# Patient Record
Sex: MALE | Race: WHITE | HISPANIC OR LATINO | URBAN - METROPOLITAN AREA
[De-identification: names, ages, dates, MRNs, and addresses within clinical notes are randomized per-mention and may not be internally consistent; named-entity substitution may affect disease eponyms.]

---

## 2022-06-24 VITALS
HEART RATE: 72 BPM | RESPIRATION RATE: 18 BRPM | DIASTOLIC BLOOD PRESSURE: 74 MMHG | OXYGEN SATURATION: 100 % | TEMPERATURE: 98 F | SYSTOLIC BLOOD PRESSURE: 122 MMHG

## 2022-06-24 PROCEDURE — 99283 EMERGENCY DEPT VISIT LOW MDM: CPT | Mod: 25

## 2022-06-24 PROCEDURE — 65205 REMOVE FOREIGN BODY FROM EYE: CPT | Mod: RT

## 2022-06-25 ENCOUNTER — HOSPITAL ENCOUNTER (EMERGENCY)
Facility: HOSPITAL | Age: 37
Discharge: HOME | End: 2022-06-25
Attending: EMERGENCY MEDICINE

## 2022-06-25 DIAGNOSIS — T15.91XA FOREIGN BODY, EYE, RIGHT, INITIAL ENCOUNTER: Primary | ICD-10-CM

## 2022-06-25 PROCEDURE — 25000003 PHARM REV CODE 250

## 2022-06-25 RX ORDER — TETRACAINE HYDROCHLORIDE 5 MG/ML
2 SOLUTION OPHTHALMIC
Status: DISCONTINUED | OUTPATIENT
Start: 2022-06-25 | End: 2022-06-25 | Stop reason: HOSPADM

## 2022-06-25 RX ORDER — ERYTHROMYCIN 5 MG/G
OINTMENT OPHTHALMIC
Qty: 1 G | Refills: 0 | Status: SHIPPED | OUTPATIENT
Start: 2022-06-25

## 2022-06-25 NOTE — DISCHARGE INSTRUCTIONS
Use ungüento oftálmico antibiótico según lo prescrito, seguimiento con oftalmología en 3-5 días. Se colocó shamir referencia y se comunicarán para programar shamir alfa. Si no llame a oftalmología [facility].    regrese a la adam de urgencias si desarrolla cambios en la visión, fiebre, secreción o drenaje del david

## 2022-06-25 NOTE — ED PROVIDER NOTES
Encounter Date: 6/24/2022       History     Chief Complaint   Patient presents with    Foreign Body in Eye     Patient presents to the ED with reports of having foreign body sensation to the right eye. Right eye with redness, irritation, and tearing. States he was at working when he felt debris get into his eye.      Patient is a 36-year-old male who presents to the emergency department for sensation foreign object in the right eye.  Patient reports that he was working in the home yesterday using a drill trying to hang frames when he felt something enter his eye.  Patient reports having 24 hours his eye has become extremely red and feels as if he has a continuously burn to remove foreign body.  Patient denies any visual changes at this time.  Patient is unsure of when he got his tetanus booster.    The history is limited by a language barrier. A  was used.     Review of patient's allergies indicates:  No Known Allergies  No past medical history on file.  No past surgical history on file.  No family history on file.     Review of Systems   Constitutional: Negative for fever.   HENT: Negative for sore throat.    Eyes: Positive for pain and redness.   Respiratory: Negative for shortness of breath.    Cardiovascular: Negative for chest pain.   Gastrointestinal: Negative for nausea.   Genitourinary: Negative for dysuria.   Musculoskeletal: Negative for back pain.   Skin: Negative for rash.   Neurological: Negative for weakness.   Hematological: Does not bruise/bleed easily.       Physical Exam     Initial Vitals [06/24/22 2151]   BP Pulse Resp Temp SpO2   122/74 72 18 97.7 °F (36.5 °C) 100 %      MAP       --         Physical Exam    Constitutional: He appears well-developed and well-nourished.   HENT:   Head: Normocephalic and atraumatic.   Eyes: EOM are normal. Pupils are equal, round, and reactive to light. Right conjunctiva is injected.   Visual acuity 20/25 bilateral  Foreign body noted in right  eye    Neck: Neck supple. No JVD present.   Normal range of motion.  Cardiovascular: Normal rate, regular rhythm, normal heart sounds and intact distal pulses.   Pulmonary/Chest: Breath sounds normal.   Abdominal: Abdomen is soft. Bowel sounds are normal.   Musculoskeletal:         General: Normal range of motion.      Cervical back: Normal range of motion and neck supple.     Lymphadenopathy:     He has no cervical adenopathy.   Neurological: He is alert and oriented to person, place, and time. He has normal strength and normal reflexes. GCS score is 15. GCS eye subscore is 4. GCS verbal subscore is 5. GCS motor subscore is 6.   Skin: Skin is warm and dry. Capillary refill takes less than 2 seconds.         ED Course   Foreign Body    Date/Time: 6/25/2022 2:13 AM  Performed by: Krys Saravia MD  Authorized by: Kel Lyons MD   Body area: eye  Location details: right conjunctiva  Anesthesia: local infiltration    Anesthesia:  Local Anesthetic: tetracaine drops    Patient sedated: no  Patient restrained: no  Localization method: eyelid eversion, visualized and magnification  Removal mechanism: moist cotton swab (18 Gauge blunt needle)  Eye examined with fluorescein.  1 objects recovered.  Post-procedure assessment: foreign body removed  Patient tolerance: Patient tolerated the procedure well with no immediate complications      Labs Reviewed - No data to display       Imaging Results    None          Medications   TETRAcaine HCl (PF) 0.5 % Drop 2 drop (has no administration in time range)   fluorescein ophthalmic strip 1 each (has no administration in time range)   Tdap (BOOSTRIX) vaccine injection 0.5 mL (has no administration in time range)     Medical Decision Making:   Initial Assessment:   Patient is a 36-year-old male presenting with sensation of foreign body in right eye.  Patient is alert oriented in no acute distress.  Patient is afebrile with vitals within  Differential Diagnosis:   Foreign body in  eye  Corneal abrasion  Doubt corneal ulceration  ED Management:  Concern for foreign body versus corneal abrasion.  Acuity 20/25 bilateral.  Concern for foreign object . Foreign object visualized.  Fluorescin  exam performed with uptake surrounding the foreign object.  Foreign object removed and eye exam repeated with no signs of rust ring.  P  Patient unclear of tetanus.  Tdap  ordered however patient did not want to wait for tetanus and declined it . Risks discussed and patient agreed.  Patient stable for to  discharge.  Patient discharged with follow-up with Ophthalmology in 3-5 days and erythromycin ointment.  Patient stable for discharge.  Patient agreeable with plan.  Strict return precautions discussed and understood.                      Clinical Impression:   Final diagnoses:  [T15.91XA] Foreign body, eye, right, initial encounter (Primary)                 Krys Saravia MD  Resident  06/25/22 1542

## 2022-06-29 ENCOUNTER — TELEPHONE (OUTPATIENT)
Dept: OPHTHALMOLOGY | Facility: CLINIC | Age: 37
End: 2022-06-29

## 2022-06-29 NOTE — TELEPHONE ENCOUNTER
----- Message from Anitha Elliott sent at 6/29/2022  1:35 PM CDT -----    ----- Message -----  From: Angeline Hopkins  Sent: 6/29/2022   1:23 PM CDT  To: [facility] Clinical Support Staff    Good afternoon,    The patient has a referral from the emergency department with a diagnosis of Foreign body, eye, right, initial encounter/ED Follow Up. Please assist with scheduling the patient?    Thank you